# Patient Record
Sex: FEMALE | Race: WHITE | NOT HISPANIC OR LATINO | Employment: STUDENT | URBAN - METROPOLITAN AREA
[De-identification: names, ages, dates, MRNs, and addresses within clinical notes are randomized per-mention and may not be internally consistent; named-entity substitution may affect disease eponyms.]

---
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
## 2025-01-10 DIAGNOSIS — N92.0 MENORRHAGIA WITH REGULAR CYCLE: ICD-10-CM

## 2025-01-10 DIAGNOSIS — N92.6 IRREGULAR MENSTRUAL CYCLE: ICD-10-CM

## 2025-01-10 DIAGNOSIS — Z30.9 ENCOUNTER FOR CONTRACEPTIVE MANAGEMENT, UNSPECIFIED TYPE: ICD-10-CM

## 2025-01-10 RX ORDER — NORETHINDRONE ACETATE AND ETHINYL ESTRADIOL 1MG-20(21)
1 KIT ORAL
Qty: 84 TABLET | Refills: 0 | Status: SHIPPED | OUTPATIENT
Start: 2025-01-10

## 2025-02-12 ENCOUNTER — OFFICE VISIT (OUTPATIENT)
Dept: OBSTETRICS AND GYNECOLOGY | Facility: CLINIC | Age: 20
End: 2025-02-12
Payer: COMMERCIAL

## 2025-02-12 VITALS
HEIGHT: 61 IN | WEIGHT: 113.13 LBS | SYSTOLIC BLOOD PRESSURE: 119 MMHG | DIASTOLIC BLOOD PRESSURE: 76 MMHG | BODY MASS INDEX: 21.36 KG/M2

## 2025-02-12 DIAGNOSIS — Z30.9 ENCOUNTER FOR CONTRACEPTIVE MANAGEMENT, UNSPECIFIED TYPE: ICD-10-CM

## 2025-02-12 DIAGNOSIS — Z01.419 WOMEN'S ANNUAL ROUTINE GYNECOLOGICAL EXAMINATION: Primary | ICD-10-CM

## 2025-02-12 PROCEDURE — 99999 PR PBB SHADOW E&M-EST. PATIENT-LVL III: CPT | Mod: PBBFAC,,, | Performed by: NURSE PRACTITIONER

## 2025-02-12 RX ORDER — NORETHINDRONE ACETATE AND ETHINYL ESTRADIOL 1MG-20(21)
1 KIT ORAL DAILY
Qty: 84 TABLET | Refills: 3 | Status: SHIPPED | OUTPATIENT
Start: 2025-02-12

## 2025-02-12 NOTE — PROGRESS NOTES
CC: Annual  HPI: Pt is a 19 y.o.  female who presents for routine annual exam. She transferred to [employer] and changed major to political science- plans to go to law school. She uses OCPs for contraception. She does not want STD screening.  Denies any GYN complaints.  The patient participates in regular exercise: yes.  The patient does not smoke.    Pt denies any domestic violence. She is s/p the HPV vaccine series.         ROS:  GENERAL: Feeling well overall. Denies fever or chills.   SKIN: Denies rash or lesions.   HEAD: Denies head injury or headache.   NODES: Denies enlarged lymph nodes.   CHEST: Denies chest pain or shortness of breath.   CARDIOVASCULAR: Denies palpitations or left sided chest pain.   ABDOMEN: No abdominal pain, constipation, diarrhea, nausea, vomiting or rectal bleeding.   URINARY: No dysuria, hematuria, or burning on urination.  REPRODUCTIVE: See HPI.   BREASTS: Denies pain, lumps, or nipple discharge.   HEMATOLOGIC: No easy bruisability or excessive bleeding.   MUSCULOSKELETAL: Denies joint pain or swelling.   NEUROLOGIC: Denies syncope or weakness.   PSYCHIATRIC: Denies depression, anxiety or mood swings.    PE:   APPEARANCE: Well nourished, well developed, White female in no acute distress.  PELVIS: Deferred     Diagnosis:  1. Women's annual routine gynecological examination    2. Encounter for contraceptive management, unspecified type        Plan:   Pap not indicated- <22 yo  OCPs refilled     Orders Placed This Encounter    norethindrone-ethinyl estradiol (BLISOVI FE 1/20, 28,) 1 mg-20 mcg (21)/75 mg (7) per tablet       Patient was counseled today on the new ACS guidelines for cervical cytology screening as well as the current recommendations for breast cancer screening. She was counseled to follow up with her PCP for other routine health maintenance. Counseling session lasted approximately 10 minutes, and all her questions were answered.    Follow-up with me in 1 year for routine  exam    Praveena Oropeza, FNP-C

## 2025-02-13 ENCOUNTER — OFFICE VISIT (OUTPATIENT)
Dept: URGENT CARE | Facility: CLINIC | Age: 20
End: 2025-02-13
Payer: COMMERCIAL

## 2025-02-13 VITALS
SYSTOLIC BLOOD PRESSURE: 108 MMHG | WEIGHT: 113 LBS | BODY MASS INDEX: 21.34 KG/M2 | TEMPERATURE: 98 F | DIASTOLIC BLOOD PRESSURE: 71 MMHG | HEIGHT: 61 IN | HEART RATE: 73 BPM | RESPIRATION RATE: 16 BRPM | OXYGEN SATURATION: 98 %

## 2025-02-13 DIAGNOSIS — H93.8X3 EAR POPPING, BILATERAL: Primary | ICD-10-CM

## 2025-02-13 PROCEDURE — 99213 OFFICE O/P EST LOW 20 MIN: CPT | Mod: S$GLB,,, | Performed by: FAMILY MEDICINE

## 2025-02-13 RX ORDER — DOXYCYCLINE HYCLATE 60 MG/1
1 TABLET, DELAYED RELEASE ORAL
COMMUNITY
Start: 2025-01-30

## 2025-02-13 RX ORDER — METHYLPREDNISOLONE 4 MG/1
TABLET ORAL
Qty: 1 EACH | Refills: 0 | Status: SHIPPED | OUTPATIENT
Start: 2025-02-13

## 2025-02-13 NOTE — PROGRESS NOTES
"Subjective:      Patient ID: Macey Crespo is a 19 y.o. female.    Vitals:  height is 5' 1" (1.549 m) and weight is 51.3 kg (113 lb). Her oral temperature is 98.2 °F (36.8 °C). Her blood pressure is 108/71 and her pulse is 73. Her respiration is 16 and oxygen saturation is 98%.     Chief Complaint: Otalgia (Right ear)    Patient reports bilateral ear popping worse on right that started last night. Patient put swimmer's ear drop in ears.      Otalgia   There is pain in both (worse on left than right) ears. The current episode started yesterday. The problem occurs constantly. The problem has been gradually worsening. There has been no fever. The pain is at a severity of 2/10. Pertinent negatives include no coughing, headaches or sore throat. Treatments tried: swimmers ear drops. The treatment provided no relief. There is no history of a chronic ear infection.       Constitution: Negative for fever.   HENT:  Positive for ear pain. Negative for sore throat.         Ear popping  Ear fullness   Respiratory:  Negative for cough.    Neurological:  Negative for headaches.      Objective:     Vitals:    02/13/25 1242   BP: 108/71   BP Location: Right arm   Patient Position: Sitting   Pulse: 73   Resp: 16   Temp: 98.2 °F (36.8 °C)   TempSrc: Oral   SpO2: 98%   Weight: 51.3 kg (113 lb)   Height: 5' 1" (1.549 m)      Physical Exam   HENT:   Head: Atraumatic.   Ears:   Right Ear: Tympanic membrane and ear canal normal. no impacted cerumen  Left Ear: Tympanic membrane and ear canal normal. no impacted cerumen     Comments: No tenderness over TMJ  Nose: No congestion.   Mouth/Throat: No posterior oropharyngeal erythema.   Eyes: Conjunctivae are normal.   Cardiovascular: Normal rate, regular rhythm, normal heart sounds and normal pulses.   Pulmonary/Chest: Effort normal and breath sounds normal.   Neurological: She is alert.       Assessment:     1. Ear popping, bilateral right > left        Plan:       Ear popping, bilateral " right > left  -     methylPREDNISolone (MEDROL DOSEPACK) 4 mg tablet; use as directed  Dispense: 1 each; Refill: 0  -     Ambulatory referral/consult to ENT    Patient Instructions   Trial of over the counter Flonase nasal spray and Zyrtec  If no improvement in 7 days, see ENT  Call to schedule an appointment: [phone]

## 2025-02-13 NOTE — PATIENT INSTRUCTIONS
Trial of over the counter Flonase nasal spray and Zyrtec  If no improvement in 7 days, see ENT  Call to schedule an appointment: [phone]

## 2025-02-24 ENCOUNTER — TELEPHONE (OUTPATIENT)
Dept: OTOLARYNGOLOGY | Facility: CLINIC | Age: 20
End: 2025-02-24
Payer: COMMERCIAL

## 2025-02-24 NOTE — TELEPHONE ENCOUNTER
I called and spoke with patient mom about getting her a appointment scheduled she stated she will ask her daughter if she is ok with that date and time and she will give me a call back.

## 2025-07-24 ENCOUNTER — OFFICE VISIT (OUTPATIENT)
Dept: PSYCHIATRY | Facility: CLINIC | Age: 20
End: 2025-07-24
Payer: COMMERCIAL

## 2025-07-24 VITALS
DIASTOLIC BLOOD PRESSURE: 63 MMHG | HEART RATE: 68 BPM | BODY MASS INDEX: 21.58 KG/M2 | SYSTOLIC BLOOD PRESSURE: 123 MMHG | WEIGHT: 114.31 LBS | HEIGHT: 61 IN

## 2025-07-24 DIAGNOSIS — F42.2 MIXED OBSESSIONAL THOUGHTS AND ACTS: ICD-10-CM

## 2025-07-24 DIAGNOSIS — F41.9 ANXIETY DISORDER, UNSPECIFIED TYPE: Primary | ICD-10-CM

## 2025-07-24 DIAGNOSIS — F41.0 PANIC DISORDER: ICD-10-CM

## 2025-07-24 PROCEDURE — 99999 PR PBB SHADOW E&M-EST. PATIENT-LVL III: CPT | Mod: PBBFAC,,,

## 2025-07-24 RX ORDER — SERTRALINE HYDROCHLORIDE 25 MG/1
25 TABLET, FILM COATED ORAL DAILY
Qty: 30 TABLET | Refills: 0 | Status: SHIPPED | OUTPATIENT
Start: 2025-07-24 | End: 2025-08-23

## 2025-07-24 RX ORDER — HYDROXYZINE PAMOATE 25 MG/1
25 CAPSULE ORAL 3 TIMES DAILY PRN
Qty: 120 CAPSULE | Refills: 0 | Status: SHIPPED | OUTPATIENT
Start: 2025-07-24

## 2025-07-24 NOTE — PROGRESS NOTES
"  Outpatient Psychiatry Clinic Initial Evaluation  [facility]  2025  Patient Name: Macey Crespo   : 2005     Source of information: Patient and Parent    Chief Complaint (CC):     Establish care for "panic attacks"       History of Present Illness (HPI):     Patient presents today to establish care, mother is present throughout visit. Patient reports feeling "anxious" since being in the waiting room, better now in office room. Prefers for mom to help explain the events leading to her symptoms, feels like crying.     Mom explains that patient is diagnosed with primary ciliary dyskinesia, does the CF vest, and the pathology has been overall well managed. Has been seeing a pulmonologist at [facility], and is enrolled under [facility]. In May of this year, patient underwent yearly swabs to screen for bacteria, did not get any immediate updates. While on vacation in  with family, patient saw on patient portal the results of her swab, positive for bacteria. She attempted to call her provider. At some point patient did this alone versus with mom who is usually involved. Received a call back from pulmonologist. Reports provider seemed uncertain about the indication for further treatment, was "hesitant about it", was aware patient was a trip but they eventually decided to "hit it hard" with a medication. Patient was prescribed Levaquin 750 mg for 7 days. At this point in the year, had undergone multiple rounds of antibiotics. Patient did not start Levaquin right away, but eventually started it and on day 4 of the medication, was at work when she had a sudden anxiety/panic attack. Patient states  "I thought I was going to die at work", "felt like I was going to pass out", walked to her car, poured water on self, and called mom then 911. Thought she may have had some hyperglycemia. States "I've had regular anxiety before, but I couldn't calm down". In ER - all labs looked good, " "potassium was a little low, received fluids and zofran. Was recommend to follow-up with her pulmonologist, felt slightly better going home. Was unable to hear back from pulmonologist for several days, eventually got a hold of them and were told "this is not my department, I've never heard of this". Mom sought advice/opinions from pharmacists regarding side effects of Levaquin.   They eventually consulted with an ENT to rule out vertigo. Patient recalls "I could not sit in the office without trying to get out, I was sitting in the dark, because I would be sweating with the lights on". ENT mentioned witnessing these symptoms from Levaquin before, and prescribed a short course of low dose Xanax. Patient was hesitant to start Xanax, at this point was "too panicked to take a medication". Has a history of "psychosis" from taking steroids in the past.   Patient was hosting a party around the  4th of July holiday, was going to cancel it but still did it. At this point had "little improvement of anxiety, but sheer exhaustion". Recalls being restless, unable to sit down. Spent the night with friends and mom picked her up the next day due to not feeling "right". In the car from the [facility] to the [facility], mom recalls patient complaining of tingling in her face, hands. They ended up going to the  ER at [facility]. Mom reports patient was "pacing" the entire time in triage, for three hours. Blood work came back normal except for some hypokalemia. Staff advised patient and mom to give the Xanax a try. Patient eventually did try a half dose of 0.5 mg Xanax - "I felt normal again, I could think again". Mom says her eyes looked brighter. When anxious, patient would feel constant fatigue, weight in her eyes, and would sleep more than her baseline ~ 10 hours a night, and still wake up fatigued, and anxious.     They recently had another family trip - they were going to cancel but patient and family still went on advice from friends and " "professionals - Xanax helped. But patient notes "I could not sit in a restaurant, I could not hike or bike".     Upon returning from [place], she visited her pediatrician who recommended  Zoloft. Took her first dose of 25 mg last week in the evening. The next morning woke up "bright eyed" for the first time, then went back to sleep. Took second dose last night - woke up at 4 am and didn't go back to sleep for another hour and a half. Denies any side effects from it other than sleep disturbances, but endorses some diarrhea. Feels this could be from food consumption or anxiety.   Has gotten her eyesight checked and thyroid levels have been wnl.     Reports since end of June she has been constantly anxious. Endorses a history of ADD and OCD with worsening of her ability to process information in this state of anxiety. Appetite has been low. Physical exercise (swimming and walking) has helped mediate some of the anxiety, but patient "still don't feel one hundred percent". Mom notes "everyday there's a one percent improvement", three weeks ago could not sit in the car or sit in office chair without feeling she needs to call 911. However this has impacted her mood and made her feel depressed. She is very independent at baseline, has never been nervous of driving or talking to people - has driven once only in a week, feels anxious talking to grandparents she is close with (fear of voice being shaky, loss of confidence). Breakthrough anxiety includes feelings of lightheadedness, sweating, some palpitations, and denies shortness of breath but notes she starts taking deep breaths. Still sleeps in but does not feel rested.     Discussed history of OCD and ADD. Patient notes she started having symptoms of OCD when she was "little", believed this was normal. Her obsessions and intrusive thoughts revolved around "my house will burn down, my family will die if don't do something". Never told parents. She would engage in " "compulsive behaviors such "I had to walk five steps in the bathroom". These symptoms faded away with growing up and maturing. But When she saw a therapist in [facility] and reported these symptoms, was able to pose a diagnosis of OCD. Latest symptoms of OCD were "manageable", centered around safety, locking her car fifteen times (even from store), checking the stove fifteen times. Is self aware about this. However in the past month, patient has obsessions about her health, leaves bedroom open at night because she is afraid she will die, "checking chase tmy hands are working", checking her heart rate multiple times a day. Compulsive behaviors in the past were calming, now exacerbate anxiety. Her health is the only thing she can think about. Has been hyperfocused on "everything I do", yesterday started noticing "loose things" such as the way she holds her phone, the steering wheel. Does not engage in rituals as much, feels "obsessed with the thought of something being wrong, trying to find an answer", which sends her into "spirals".      Regarding symptoms of "ADD", patient was never hyperactive in childhood, mom describes her then as "shy, introverted". Patient has difficulty recalling exact symptoms but reports they were "very manageable". Mentions some distractibility, leaving tasks unfinished especially if not interested. Was referred to a pediatric psychiatrist at the time who started her on Cotempla in [facility], and patient started seeing a therapist. Patient reports symptoms of anxiety on the medication and it was eventually discontinued.     Felt "very depressed yesterday", lost her self-confidence, "I was so happy a month ago". Can do tasks at hand, but has difficulty focusing and initiating tasks. States "nothing is sticking in my brain like it used to", has difficulty processing messages or information when talking with family. "I was very sharp before". Denies any thoughts of suicide of self harm. " "    Denies any AVH or paranoia. History of psychosis was when she got her wisdom teeth removed, took prescribed steroid and had an episode of "staring at shelves and items", "could not focus on anything", "looking at stuff but not clicking", was unsure if she had to "focus on myself or what was in front of me". Felt overwhelmed, shaky, and like she was dying. The symptoms resolved that day and never happened again.     Mom was on zoloft for a long time, then switched to prozac (both were effective).       Current medication regimen:   - birth control daily   - nasacort as needed  - Xanax 0.5 mg - has 10 x 0.25 pieces of tablet left     Past Medication Trials:  Cotempla - short trial, low dose in highschool, had rebound anxiety         Psychiatric Review of Systems (ROS):     Depression: endorses high threshold for happiness, no SI, no anhedonia, +rumination      Monika/hypomania: no      Anxiety (SUE and PA): excessive worrying, restlessness, catastrophizing, inability to control, fatigue, hypersomnia, somatic symptoms (sweating, paresthesias, some palpitations     Psychosis: none currently      PTSD: no      Eating Disorders: no     OCD: intrusive/obsessive thoughts about health, hyper focusing on self      ADHD: currently has difficulty initiating, managing tasks, organizing thoughts, easily distractible by own thoughts     Sleep: average 7 hours, days of 10 hours and still feels tired.     Appetite: lower this past month       Past Medical, Psychiatric, Family, and Social History:       Past Medical/Surgical History:  Past Medical History:   Diagnosis Date    Bronchopneumonia 04/01/2012    bronchoscopy diagnosis    Chronic cough     + h/o Chronic Cough & Bronchitis, [facility] (ENT), Chavez (pulm)    Ciliary dyskinesia     GERD (gastroesophageal reflux disease)     + GERD    Seasonal allergic rhinitis     Tracheomalacia     since birth, followed by Dr. Byrne     Past Surgical History:   Procedure Laterality " Date    ADENOIDECTOMY      Adenoids w/ 1 of 3 Sets of PET's    FLEXIBLE BRONCHOSCOPY W/ BRONCHOPULMONARY LAVAGE      KNEE SURGERY Left 07/11/2024    SINUS SURGERY  01/01/2013    TYMPANOSTOMY TUBE PLACEMENT      PET's (x3 Sets). Adenoidectomy w/ 1 set of PET's       Past Neurological History:  Seizures:  no    Head trauma:  no     Past Psychiatric History:  Previous Medication Trials: contempla   Previous Psychiatric Hospitalizations: no   Previous Suicide Attempts: no   History of Violence: no   Outpatient Psychiatrist: no   Outpatient Therapist: Sharmila God[facility] at [facility]   Family History: mom with SUE     Social History:  Marital Status: single   Children: 0    Employment Status/Info: [employer]   Education: [employer]   Special Ed: no   Housing Status: lives with mom, dad, brother (24)   History of phys/sexual abuse: no   Access to gun: no     Substance Abuse History:  Recreational Drugs: never   Use of Alcohol: has tried alcohol, on occasion (months apart)   Tobacco Use: no   Use of OTC: yes for period cramps at time   Rehab History: n/a  AA/NA Meetings:  n/a  Periods of Sobriety:  n/a  Withdrawal:  n/a    Legal History:  Past Charges/Incarcerations: no   Pending charges: no     Allergies:  Patient has no known allergies.      Risk Parameters:  Patient reports no suicidal ideation  Patient reports no homicidal ideation  Patient reports no self-injurious behavior  Patient reports no violent behavior      MENTAL STATUS EXAMINATION  General Appearance: appears stated age, well developed and nourished, adequately groomed and appropriately dressed, in no acute distress  Behavior: normal; cooperative; reasonably friendly, pleasant, and polite; appropriate eye-contact; under good behavioral control  Involuntary Movements and Motor Activity: no abnormal involuntary movements noted; no tics, no tremors, no akathisia, no dystonia, no evidence of tardive dyskinesia; no psychomotor  "agitation or retardation  Muscle Strength and Tone: intact  Gait and Station: intact, normal gait and station, ambulates without assistance  Speech: intact; normal rate, rhythm, volume, tone and pitch; conversational, spontaneous, and coherent  Language: intact; speaks and understands English fluently and proficiently; repeats words and phrases, no word finding difficulties are noted  Mood: "Anxious, a bit relieved, calmer"   Affect: reactive, appropriate to situation and context, mood-incongruent  Thought Process: intact, linear, goal-directed, organized, logical  Associations: intact, no loosening of associations  Thought Content and Perceptions: no suicidal or homicidal ideation, no auditory or visual hallucinations, no paranoid ideation, no ideas of reference, no evidence of delusions or psychosis  Sensorium/Arousal: alert with clear sensorium  Orientation: intact; oriented fully to person, place, time and situation  Recent and Remote Memory: grossly intact, able to recall relevant and salient information from the recent and remote past  Attention and Concentration: grossly intact, attentive to the conversation and not readily distractible  Fund of Knowledge: grossly intact, used appropriate vocabulary and demonstrated an awareness of current events  Insight: adequate  Judgment: intact, behavior is adequate/appropriate to the circumstances, compliant with health provider's recommendations and instructions      Medical Decision Making - Assessment:     DIAGNOSIS:    ICD-10-CM ICD-9-CM   1. Anxiety disorder, unspecified type  F41.9 300.00   2. Panic disorder  F41.0 300.01   3. Mixed obsessional thoughts and acts  F42.2 300.3       General Impression:  Mami Crespo is a 21 yo female with a past psychiatric history of OCD and ADD who presents to establish care for new symptoms of anxiety and panic. Medical history of significant for PCD. These symptoms appear to have occurred in the setting of Levaquin treatment. " "Also resemble a reported "psychotic" episode experienced while on steroids. Anxiety symptoms are centered around self, health, and worsening intrusive/obsessive thoughts. Mood now impacted by low self confidence, debilitating symptoms, and loss of independence. No SI. Started on Zoloft per pediatrician, appropriate for symptoms of anxiety, depression and OCD. Will start PRN for breakthrough symptoms pending therapeutic dosing of Zoloft.     Strengths and liabilities:  Strength: Patient accepts guidance/feedback, Strength: Patient is expressive/articulate., Strength: Patient is intelligent., Strength: Patient is motivated for change., Strength: Patient is physically healthy., Strength: Patient has positive support network., Strength: Patient has reasonable judgment., Strength: Patient is stable.  Protective factors: help-seeking/motivated for change, intact reality testing, intact support system, no SI, no recent attempts, no substance use disorder, no access to firearms, and female  Risk factors: , age 15-25 or 59+ y/o, and single        Medical Decision Making - Plan:     CONTINUE Zoloft 25 mg daily for anxiety/OCD, patient advised to take it in the AM   Next refill ordered   START Vistaril 25 mg TID PRN for breakthrough anxiety   PMDP reviewed: no discrepancies noted. Filled Xanax 0.5 mg x 10 tabs on 7/2/25   Lifestyle modifications to reduce symptoms non-pharmacologically (ex: sleep, exercise, diet, etc).  Established with psychotherapy  Limit substance use as drugs/alcohol can exacerbate psychiatric symptoms - patient does not use   Sleep hygiene encouraged     RTC 4-6 weeks or sooner if needed    Discussed with patient verbal informed consent including: risks and benefits of proposed treatment above vs. alternative treatments vs. no treatment, serious and common side effects of these respective treatments, as well as the inherent unpredictability of individual responses to any treatments, contingency " plans if adverse reactions occur, precautions in which to me through Epic MyChart portal or call the clinic, and precautions in which to call 911 and/or go to the emergency room. The patient expresses understanding of the above and displays the capacity to agree with this current plan. All questions were answered.    Case discussed & seen by staff psychiatrist: Dr. Hi Hernandez.        Billing Documentation:     Method of Encounter IN PERSON visit at the clinic   Type of Encounter New patient to me, NOT seen by department within last 3 years   Counseling;  Psychotherapy Not applicable: Not done or UNDER 16 minutes   Counseling;  Tobacco and/or Nicotine Not applicable: Not done or UNDER 3 minutes   Time Remaining Chart/Pt 86769: NEW patient to me with MEDICATION MANAGEMENT (and two or fewer 48759/45287 codes within a year), 45+mins   Total Mins  (7/24/2025) TIME: I spent a total of 54 minutes on the day of the visit. This includes face to face time and non-face to face time preparing to see the patient (eg, review of tests), obtaining and/or reviewing separately obtained history, documenting clinical information in the electronic or other health record, independently interpreting results and communicating results to the patient/family/caregiver, or care coordinator.         Arvind Ramirez MD  [facility], PGY-III  [facility]

## 2025-07-27 ENCOUNTER — PATIENT MESSAGE (OUTPATIENT)
Dept: PSYCHIATRY | Facility: CLINIC | Age: 20
End: 2025-07-27
Payer: COMMERCIAL

## 2025-08-18 ENCOUNTER — PATIENT MESSAGE (OUTPATIENT)
Dept: PSYCHIATRY | Facility: CLINIC | Age: 20
End: 2025-08-18
Payer: COMMERCIAL